# Patient Record
Sex: MALE | Race: WHITE | Employment: OTHER | URBAN - METROPOLITAN AREA
[De-identification: names, ages, dates, MRNs, and addresses within clinical notes are randomized per-mention and may not be internally consistent; named-entity substitution may affect disease eponyms.]

---

## 2019-06-05 ENCOUNTER — OFFICE VISIT (OUTPATIENT)
Dept: FAMILY MEDICINE | Facility: CLINIC | Age: 39
End: 2019-06-05

## 2019-06-05 VITALS
HEIGHT: 68 IN | BODY MASS INDEX: 34.22 KG/M2 | SYSTOLIC BLOOD PRESSURE: 154 MMHG | TEMPERATURE: 97.3 F | WEIGHT: 225.8 LBS | RESPIRATION RATE: 16 BRPM | DIASTOLIC BLOOD PRESSURE: 96 MMHG | HEART RATE: 76 BPM | OXYGEN SATURATION: 96 %

## 2019-06-05 DIAGNOSIS — Z23 ENCOUNTER FOR IMMUNIZATION: ICD-10-CM

## 2019-06-05 DIAGNOSIS — Z22.39 CARRIER OF UREAPLASMA UREALYTICUM: Primary | ICD-10-CM

## 2019-06-05 PROCEDURE — 99000 SPECIMEN HANDLING OFFICE-LAB: CPT | Performed by: FAMILY MEDICINE

## 2019-06-05 PROCEDURE — 90715 TDAP VACCINE 7 YRS/> IM: CPT | Performed by: FAMILY MEDICINE

## 2019-06-05 PROCEDURE — 87798 DETECT AGENT NOS DNA AMP: CPT | Mod: 90 | Performed by: FAMILY MEDICINE

## 2019-06-05 PROCEDURE — 99202 OFFICE O/P NEW SF 15 MIN: CPT | Mod: 25 | Performed by: FAMILY MEDICINE

## 2019-06-05 PROCEDURE — 90471 IMMUNIZATION ADMIN: CPT | Performed by: FAMILY MEDICINE

## 2019-06-05 RX ORDER — DOXYCYCLINE 100 MG/1
100 CAPSULE ORAL 2 TIMES DAILY
Qty: 56 CAPSULE | Refills: 0 | Status: SHIPPED | OUTPATIENT
Start: 2019-06-05 | End: 2019-07-03

## 2019-06-05 ASSESSMENT — MIFFLIN-ST. JEOR: SCORE: 1908.72

## 2019-06-05 NOTE — PROGRESS NOTES
"Subjective     Donnie JOSH Andrews is a 39 year old male who presents to clinic today for the following health issues:    HPI   Chief Complaint   Patient presents with     STD     girlfriend tested positive for a bacteria, was told to come in for testing   Girlfriend has ureaplasma vaginitis/cervicitis and plan to test and treat patient for ureaplasma.  Patient has no symptoms.  No discharge from penis.  No pain with urination.        Reviewed and updated as needed this visit by Provider         Review of Systems   ROS COMP: Constitutional, HEENT, cardiovascular, pulmonary, gi and gu systems are negative, except as otherwise noted.      Objective    BP (!) 134/104 (BP Location: Right arm, Patient Position: Sitting, Cuff Size: Adult Regular)   Pulse 76   Temp 97.3  F (36.3  C) (Tympanic)   Resp 16   Ht 1.727 m (5' 8\")   Wt 102.4 kg (225 lb 12.8 oz)   SpO2 96%   BMI 34.33 kg/m    Body mass index is 34.33 kg/m .  Physical Exam   GENERAL: healthy, alert and no distress  PSYCH: mentation appears normal, affect normal/bright    Diagnostic Test Results:  Labs reviewed in Epic        Assessment & Plan     Donnie was seen today for std.    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    Weight as of this encounter: 102.4 kg (225 lb 12.8 oz).   Weight management plan: Patient was referred to their PCP to discuss a diet and exercise plan.        Patient Instructions   Test and treat as if a carrier of ureaplasma so we can clear your significant other's infection.    Check your blood pressures out side of clinic or come in for a physical exam at some point for recheck too.    Goal <140/ <90 if " 140/90 or higher then return to clinic to discuss high blood pressure treatment.      Return if symptoms worsen or fail to improve.    Benito Mccabe MD  [facility] [place]

## 2019-06-05 NOTE — PATIENT INSTRUCTIONS
Test and treat as if a carrier of ureaplasma so we can clear your significant other's infection.    Check your blood pressures out side of clinic or come in for a physical exam at some point for recheck too.    Goal <140/ <90 if 140/90 or higher then return to clinic to discuss high blood pressure treatment.

## 2019-06-07 LAB
SPECIMEN SOURCE: ABNORMAL
U PARVUM DNA SPEC QL NAA+PROBE: NEGATIVE
U UREALYTICUM DNA SPEC QL NAA+PROBE: POSITIVE